# Patient Record
Sex: FEMALE | URBAN - METROPOLITAN AREA
[De-identification: names, ages, dates, MRNs, and addresses within clinical notes are randomized per-mention and may not be internally consistent; named-entity substitution may affect disease eponyms.]

---

## 2018-07-23 ENCOUNTER — HOSPITAL ENCOUNTER (OUTPATIENT)
Dept: LAB | Age: 53
Discharge: HOME | End: 2018-07-23

## 2018-08-02 ENCOUNTER — HOSPITAL ENCOUNTER (OUTPATIENT)
Dept: LAB | Age: 53
Discharge: HOME | End: 2018-08-02

## 2020-11-01 ENCOUNTER — HOSPITAL ENCOUNTER (EMERGENCY)
Age: 55
Discharge: HOME | End: 2020-11-01
Attending: EMERGENCY MEDICINE
Payer: COMMERCIAL

## 2020-11-01 VITALS
RESPIRATION RATE: 15 BRPM | BODY MASS INDEX: 47.09 KG/M2 | HEART RATE: 71 BPM | SYSTOLIC BLOOD PRESSURE: 128 MMHG | WEIGHT: 293 LBS | DIASTOLIC BLOOD PRESSURE: 86 MMHG | OXYGEN SATURATION: 99 % | HEIGHT: 66 IN | TEMPERATURE: 98.2 F

## 2020-11-01 DIAGNOSIS — H30.23 UVEITIS, INTERMEDIATE, BILATERAL: Primary | ICD-10-CM

## 2020-11-01 PROCEDURE — 99283 EMERGENCY DEPT VISIT LOW MDM: CPT

## 2020-11-01 PROCEDURE — 74011000250 HC RX REV CODE- 250: Performed by: STUDENT IN AN ORGANIZED HEALTH CARE EDUCATION/TRAINING PROGRAM

## 2020-11-01 PROCEDURE — 74011250636 HC RX REV CODE- 250/636: Performed by: PHYSICIAN ASSISTANT

## 2020-11-01 RX ORDER — TETRACAINE HYDROCHLORIDE 5 MG/ML
2 SOLUTION OPHTHALMIC
Status: COMPLETED | OUTPATIENT
Start: 2020-11-01 | End: 2020-11-01

## 2020-11-01 RX ORDER — MECLIZINE HCL 12.5 MG 12.5 MG/1
25 TABLET ORAL
Status: COMPLETED | OUTPATIENT
Start: 2020-11-01 | End: 2020-11-01

## 2020-11-01 RX ADMIN — TETRACAINE HYDROCHLORIDE 2 DROP: 5 SOLUTION OPHTHALMIC at 17:13

## 2020-11-01 RX ADMIN — MECLIZINE 25 MG: 12.5 TABLET ORAL at 16:41

## 2020-11-01 NOTE — ED PROVIDER NOTES
12-year-old female past medical history of of uterine fibroids, unknown eye infection/inflammation, von Willebrand disease presents [facility] to 2 to 3 days of bilateral eye pain, \"hazy vision\", headache, and vertigo. Patient states she had a mysterious eye infection/inflammation about 10 years ago which took a few months to resolve. At that time she had to have frequently checked eye pressures and use steroid eyedrops. Patient denies splashing any liquids or working with metal or wood that could have caused foreign body in the eyes. Denies fever, nausea, vomiting. Patient last saw an ophthalmologist in [place]. Does not have one locally. Does not have primary care locally. Patient has lots of floaters bilaterally, no veil over vision or loss of peripheral vision           Past Medical History:   Diagnosis Date    Fibroid uterus     History of eye infection     Von Willebrand disease ([address]        Past Surgical History:   Procedure Laterality Date    IR OCCL TXCATH ORGAN W SI           History reviewed. No pertinent family history. Social History     Socioeconomic History    Marital status:      Spouse name: Not on file    Number of children: Not on file    Years of education: Not on file    Highest education level: Not on file   Occupational History    Not on file   Social Needs    Financial resource strain: Not on file    Food insecurity     Worry: Not on file     Inability: Not on file    Transportation needs     Medical: Not on file     Non-medical: Not on file   Tobacco Use    Smoking status: Former Smoker    Smokeless tobacco: Never Used   Substance and Sexual Activity    Alcohol use:  Yes     Alcohol/week: 1.0 standard drinks     Types: 1 Glasses of wine per week     Comment: 1 glass/month     Drug use: Never    Sexual activity: Not on file   Lifestyle    Physical activity     Days per week: Not on file     Minutes per session: Not on file  Stress: Not on file   Relationships    Social connections     Talks on phone: Not on file     Gets together: Not on file     Attends [employer] service: Not on file     Active member of club or organization: Not on file     Attends meetings of clubs or organizations: Not on file     Relationship status: Not on file    Intimate partner violence     Fear of current or ex partner: Not on file     Emotionally abused: Not on file     Physically abused: Not on file     Forced sexual activity: Not on file   Other Topics Concern    Not on file   Social History Narrative    Not on file         ALLERGIES: Codeine and Egg derived    Review of Systems   Constitutional: Negative for fever. HENT: Negative for congestion and sore throat. Eyes: Positive for photophobia, pain, redness and visual disturbance. Bilateral, L>R   Respiratory: Negative for cough and shortness of breath. Cardiovascular: Negative for chest pain. Gastrointestinal: Negative for nausea and vomiting. Genitourinary: Negative for dysuria. Musculoskeletal: Negative for myalgias. Skin: Negative for rash. Neurological: Positive for dizziness and headaches. Negative for speech difficulty and weakness. Vitals:    11/01/20 1402   BP: 128/86   Pulse: 71   Resp: 15   Temp: 98.2 °F (36.8 °C)   SpO2: 99%   Weight: 138 kg (304 lb 3.8 oz)   Height: 5' 6\" (1.676 m)            Physical Exam  Vitals signs and nursing note reviewed. Constitutional:       General: She is not in acute distress. HENT:      Head: Normocephalic. Nose: Nose normal.      Mouth/Throat:      Mouth: Mucous membranes are moist.   Eyes:      Intraocular pressure: Right eye pressure is 25 mmHg. Left eye pressure is 24 mmHg. Extraocular Movements: Extraocular movements intact. Conjunctiva/sclera:      Right eye: Right conjunctiva is injected. No exudate. Left eye: Left conjunctiva is injected. No exudate.      Pupils: Pupils are equal, round, and reactive to light. Comments: Mild consensual photophobia, L>R   Neck:      Musculoskeletal: Normal range of motion. Pulmonary:      Effort: Pulmonary effort is normal. No respiratory distress. Skin:     General: Skin is dry. Findings: No rash. Neurological:      Mental Status: She is alert and oriented to person, place, and time. Cranial Nerves: Cranial nerves are intact. Sensory: Sensation is intact. Motor: Motor function is intact. Coordination: Coordination is intact. Gait: Gait is intact. Psychiatric:         Mood and Affect: Mood normal.        Medications   meclizine (ANTIVERT) tablet 25 mg (25 mg Oral Given 11/1/20 1641)   tetracaine HCl (PF) (PONTOCAINE) 0.5 % ophthalmic solution 2 Drop (2 Drops Both Eyes Given by Provider 11/1/20 1713)     Labs Reviewed - No data to display  No orders to display         MDM  Number of Diagnoses or Management Options  Uveitis, intermediate, bilateral:     Differential diagnosis includes glaucoma, conjunctivitis, uveitis, and others    Intraocular pressures mildly elevated, documented    History and current presentation consistent with bilateral uveitis. Consulted ophthalmology, spoke to Dr. Nicki Hu, who recommends immediate follow-up tomorrow morning with  [place].   Reviewed this plan with the patient who is agreeable    Case reviewed with attending physician, Dr. Mary Jack PA-C  11/1/2020

## 2020-11-01 NOTE — ED TRIAGE NOTES
Pt ambulatory to ED accompanied by  with c/o bilateral eye pain and redness onset 3 days ago. Referred to [facility] from [facility] for further evaluation. \"In 2010 I had something similar and was dx with an systemic infection in both eyes. I used steroid drops and eye pressure checked daily. It lasted all that summer\".

## 2020-11-01 NOTE — DISCHARGE INSTRUCTIONS
Patient Education        Uveitis: Care Instructions  Your Care Instructions     Uveitis (say \"you-vee-EYE-tus\") is swelling and tenderness of the middle layer of the eye. This area includes the colored part of the eye (iris), muscles, and blood vessels. One or both of your eyes may be swollen, red, and painful. You may have blurred vision. You may have gotten uveitis from an infection, but the cause is often not known. There are three types of uveitis. · Anterior uveitis is the most common type. This is pain and swelling of the front part of the eye. It is treated with eyedrops or ointment and usually lasts less than 6 weeks. · Intermediate uveitis affects the middle of the eye. It may be treated with eyedrops or with medicine given in a shot. It usually lasts longer than 6 weeks. · Posterior uveitis is the least common type. It affects the back of the eye. This is usually treated with medicine. It can last from a few weeks to a few years. It is important to treat uveitis. Treatment can save your eyesight and avoid permanent damage to your eyes. Follow-up care is a key part of your treatment and safety. Be sure to make and go to all appointments, and call your doctor if you are having problems. It's also a good idea to know your test results and keep a list of the medicines you take. How can you care for yourself at home? · Take your medicines exactly as prescribed. Call your doctor if you have any problems with your medicine. You will get more details on the specific medicines your doctor prescribes. · Use any prescribed eyedrops or ointments exactly as your doctor told you to. · Keep the eyedropper or bottle tip clean. · If you are using eyedrops and an ointment, put in the eyedrops before you use the ointment. · To put in eyedrops or ointment:  ? Tilt your head back, and pull your lower eyelid down with one finger. ? Drop or squirt the medicine inside the lower lid.   ? Close your eye for 30 to 60 seconds to let the drops or ointment move around. ? Do not touch the ointment or dropper tip to your eyelashes or any other surface. · Wear sunglasses if light hurts your eyes. · Do not wear contact lenses until your eyes have healed. · Do not wear eye makeup until your eyes have healed. · Do not drive if you have blurred vision. When should you call for help? Call your doctor now or seek immediate medical care if:    · You have signs of an eye infection, such as:  ? Pus or thick discharge coming from the eye.  ? Redness or swelling around the eye.  ? A fever.     · You have new or worse eye pain.     · You have vision changes.     · It feels like there is something in your eye.     · Light hurts your eye. Watch closely for changes in your health, and be sure to contact your doctor if:    · You do not get better as expected. Where can you learn more? Go to http://www.gray.com/  Enter A559 in the search box to learn more about \"Uveitis: Care Instructions. \"  Current as of: December 18, 2019               Content Version: 12.6  © 5968-7310 [employer]. Care instructions adapted under license by [employer] (which disclaims liability or warranty for this information). If you have questions about a medical condition or this instruction, always ask your healthcare professional. [address] any warranty or liability for your use of this information.

## 2020-11-02 ENCOUNTER — TRANSCRIBE ORDER (OUTPATIENT)
Dept: GENERAL RADIOLOGY | Age: 55
End: 2020-11-02

## 2020-11-02 ENCOUNTER — HOSPITAL ENCOUNTER (OUTPATIENT)
Dept: GENERAL RADIOLOGY | Age: 55
Discharge: HOME | End: 2020-11-02
Payer: COMMERCIAL

## 2020-11-02 DIAGNOSIS — H20.9 GLAUCOMATOCYCLITIC CRISES: ICD-10-CM

## 2020-11-02 DIAGNOSIS — H40.40X0 GLAUCOMATOCYCLITIC CRISES: Primary | ICD-10-CM

## 2020-11-02 DIAGNOSIS — H20.9 GLAUCOMATOCYCLITIC CRISES: Primary | ICD-10-CM

## 2020-11-02 DIAGNOSIS — H40.40X0 GLAUCOMATOCYCLITIC CRISES: ICD-10-CM

## 2020-11-02 PROCEDURE — 71046 X-RAY EXAM CHEST 2 VIEWS: CPT

## 2024-10-23 ENCOUNTER — TELEPHONE (OUTPATIENT)
Age: 59
End: 2024-10-23

## 2024-10-23 NOTE — TELEPHONE ENCOUNTER
Patient has New Patient appointment with Dr Ervin  11/7/2024. She states she has Von Abrahan Disease but she has no proof. She would like to add those labs along with the Pre-Op lab work she will need that day     [phone]

## 2024-11-05 SDOH — HEALTH STABILITY: PHYSICAL HEALTH: ON AVERAGE, HOW MANY DAYS PER WEEK DO YOU ENGAGE IN MODERATE TO STRENUOUS EXERCISE (LIKE A BRISK WALK)?: 6 DAYS

## 2024-11-05 SDOH — HEALTH STABILITY: PHYSICAL HEALTH: ON AVERAGE, HOW MANY MINUTES DO YOU ENGAGE IN EXERCISE AT THIS LEVEL?: 40 MIN

## 2024-11-07 ENCOUNTER — OFFICE VISIT (OUTPATIENT)
Age: 59
End: 2024-11-07
Payer: COMMERCIAL

## 2024-11-07 ENCOUNTER — LAB (OUTPATIENT)
Age: 59
End: 2024-11-07

## 2024-11-07 VITALS
BODY MASS INDEX: 23.66 KG/M2 | HEIGHT: 65 IN | SYSTOLIC BLOOD PRESSURE: 112 MMHG | OXYGEN SATURATION: 99 % | WEIGHT: 142 LBS | DIASTOLIC BLOOD PRESSURE: 78 MMHG | TEMPERATURE: 97 F | HEART RATE: 60 BPM | RESPIRATION RATE: 16 BRPM

## 2024-11-07 DIAGNOSIS — Z11.59 ENCOUNTER FOR HCV SCREENING TEST FOR LOW RISK PATIENT: ICD-10-CM

## 2024-11-07 DIAGNOSIS — Z13.220 SCREENING FOR LIPID DISORDERS: ICD-10-CM

## 2024-11-07 DIAGNOSIS — Z01.818 PRE-OPERATIVE CLEARANCE: ICD-10-CM

## 2024-11-07 DIAGNOSIS — D68.9 COAGULATION DISORDER (HCC): ICD-10-CM

## 2024-11-07 DIAGNOSIS — Z11.4 SCREENING FOR HIV (HUMAN IMMUNODEFICIENCY VIRUS): ICD-10-CM

## 2024-11-07 DIAGNOSIS — Z23 ENCOUNTER FOR IMMUNIZATION: ICD-10-CM

## 2024-11-07 DIAGNOSIS — C44.310 BASAL CELL CARCINOMA OF FACE: Primary | ICD-10-CM

## 2024-11-07 DIAGNOSIS — Z53.20 PAP SMEAR OF CERVIX DECLINED: ICD-10-CM

## 2024-11-07 LAB
ALBUMIN SERPL-MCNC: 3.7 G/DL (ref 3.5–5)
ALBUMIN/GLOB SERPL: 1.2 (ref 1.1–2.2)
ALP SERPL-CCNC: 114 U/L (ref 45–117)
ALT SERPL-CCNC: 32 U/L (ref 12–78)
ANION GAP SERPL CALC-SCNC: 1 MMOL/L (ref 2–12)
AST SERPL-CCNC: 22 U/L (ref 15–37)
BASOPHILS # BLD: 0.1 K/UL (ref 0–0.1)
BASOPHILS NFR BLD: 1 % (ref 0–1)
BILIRUB SERPL-MCNC: 0.9 MG/DL (ref 0.2–1)
BUN SERPL-MCNC: 14 MG/DL (ref 6–20)
BUN/CREAT SERPL: 18 (ref 12–20)
CALCIUM SERPL-MCNC: 9.7 MG/DL (ref 8.5–10.1)
CHLORIDE SERPL-SCNC: 109 MMOL/L (ref 97–108)
CHOLEST SERPL-MCNC: 224 MG/DL
CO2 SERPL-SCNC: 32 MMOL/L (ref 21–32)
CREAT SERPL-MCNC: 0.76 MG/DL (ref 0.55–1.02)
DIFFERENTIAL METHOD BLD: NORMAL
EOSINOPHIL # BLD: 0.4 K/UL (ref 0–0.4)
EOSINOPHIL NFR BLD: 7 % (ref 0–7)
ERYTHROCYTE [DISTWIDTH] IN BLOOD BY AUTOMATED COUNT: 12.7 % (ref 11.5–14.5)
GLOBULIN SER CALC-MCNC: 3.1 G/DL (ref 2–4)
GLUCOSE SERPL-MCNC: 101 MG/DL (ref 65–100)
HCT VFR BLD AUTO: 44.2 % (ref 35–47)
HCV AB SER IA-ACNC: <0.02 INDEX
HCV AB SERPL QL IA: NONREACTIVE
HDLC SERPL-MCNC: 70 MG/DL
HDLC SERPL: 3.2 (ref 0–5)
HGB BLD-MCNC: 14.3 G/DL (ref 11.5–16)
HIV 1+2 AB+HIV1 P24 AG SERPL QL IA: NONREACTIVE
HIV 1/2 RESULT COMMENT: NORMAL
IMM GRANULOCYTES # BLD AUTO: 0 K/UL (ref 0–0.04)
IMM GRANULOCYTES NFR BLD AUTO: 0 % (ref 0–0.5)
LDLC SERPL CALC-MCNC: 134.2 MG/DL (ref 0–100)
LYMPHOCYTES # BLD: 1.1 K/UL (ref 0.8–3.5)
LYMPHOCYTES NFR BLD: 18 % (ref 12–49)
MCH RBC QN AUTO: 29.9 PG (ref 26–34)
MCHC RBC AUTO-ENTMCNC: 32.4 G/DL (ref 30–36.5)
MCV RBC AUTO: 92.3 FL (ref 80–99)
MONOCYTES # BLD: 0.5 K/UL (ref 0–1)
MONOCYTES NFR BLD: 9 % (ref 5–13)
NEUTS SEG # BLD: 3.7 K/UL (ref 1.8–8)
NEUTS SEG NFR BLD: 65 % (ref 32–75)
NRBC # BLD: 0 K/UL (ref 0–0.01)
NRBC BLD-RTO: 0 PER 100 WBC
PLATELET # BLD AUTO: 262 K/UL (ref 150–400)
PMV BLD AUTO: 10.9 FL (ref 8.9–12.9)
POTASSIUM SERPL-SCNC: 4.9 MMOL/L (ref 3.5–5.1)
PROT SERPL-MCNC: 6.8 G/DL (ref 6.4–8.2)
RBC # BLD AUTO: 4.79 M/UL (ref 3.8–5.2)
SODIUM SERPL-SCNC: 142 MMOL/L (ref 136–145)
TRIGL SERPL-MCNC: 99 MG/DL
VLDLC SERPL CALC-MCNC: 19.8 MG/DL
WBC # BLD AUTO: 5.8 K/UL (ref 3.6–11)

## 2024-11-07 PROCEDURE — 90471 IMMUNIZATION ADMIN: CPT | Performed by: FAMILY MEDICINE

## 2024-11-07 PROCEDURE — 90715 TDAP VACCINE 7 YRS/> IM: CPT | Performed by: FAMILY MEDICINE

## 2024-11-07 PROCEDURE — 99203 OFFICE O/P NEW LOW 30 MIN: CPT | Performed by: FAMILY MEDICINE

## 2024-11-07 RX ORDER — PREDNISOLONE ACETATE 10 MG/ML
SUSPENSION/ DROPS OPHTHALMIC
COMMUNITY
Start: 2024-08-22

## 2024-11-07 SDOH — ECONOMIC STABILITY: FOOD INSECURITY: WITHIN THE PAST 12 MONTHS, THE FOOD YOU BOUGHT JUST DIDN'T LAST AND YOU DIDN'T HAVE MONEY TO GET MORE.: NEVER TRUE

## 2024-11-07 SDOH — ECONOMIC STABILITY: FOOD INSECURITY: WITHIN THE PAST 12 MONTHS, YOU WORRIED THAT YOUR FOOD WOULD RUN OUT BEFORE YOU GOT MONEY TO BUY MORE.: NEVER TRUE

## 2024-11-07 SDOH — ECONOMIC STABILITY: INCOME INSECURITY: HOW HARD IS IT FOR YOU TO PAY FOR THE VERY BASICS LIKE FOOD, HOUSING, MEDICAL CARE, AND HEATING?: NOT HARD AT ALL

## 2024-11-07 ASSESSMENT — PATIENT HEALTH QUESTIONNAIRE - PHQ9
SUM OF ALL RESPONSES TO PHQ QUESTIONS 1-9: 0
2. FEELING DOWN, DEPRESSED OR HOPELESS: NOT AT ALL
SUM OF ALL RESPONSES TO PHQ9 QUESTIONS 1 & 2: 0
SUM OF ALL RESPONSES TO PHQ QUESTIONS 1-9: 0
1. LITTLE INTEREST OR PLEASURE IN DOING THINGS: NOT AT ALL

## 2024-11-07 NOTE — PROGRESS NOTES
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         Health Maintenance Due   Topic    Depression Screen     HIV screen     Hepatitis C screen     DTaP/Tdap/Td vaccine (1 - Tdap)    Cervical cancer screen     Lipids     Breast cancer screen     Colorectal Cancer Screen     Shingles vaccine (1 of 2)    Respiratory Syncytial Virus (RSV) Pregnant or age 60 yrs+ (1 - 1-dose 60+ series)    Flu vaccine (1)    COVID-19 Vaccine ( - - season)       Wt Readings from Last 3 Encounters:   24 64.4 kg (142 lb)     Temp Readings from Last 3 Encounters:   24 97 °F (36.1 °C) (Temporal)     BP Readings from Last 3 Encounters:   24 112/78     Pulse Readings from Last 3 Encounters:   24 60           Depression Screening:  :         2024     9:09 AM   PHQ-9 Questionaire   Little interest or pleasure in doing things 0   Feeling down, depressed, or hopeless 0   PHQ-9 Total Score 0        Fall Risk Assessment:  :          No data to display                 Abuse Screening:  :          No data to display                 Coordination of Care Questionnaire:  :     \"Have you been to the ER, urgent care clinic since your last visit?  Hospitalized since your last visit?\"    NO    “Have you seen or consulted any other health care providers outside our system since your last visit?”    NO    Have you had a mammogram?”   NO    No breast cancer screening on file      “Have you had a pap smear?”    NO    No cervical cancer screening on file       “Have you had a colorectal cancer screening such as a colonoscopy/FIT/Cologuard?    NO    No colonoscopy on file  No cologuard on file  No FIT/FOBT on file   No flexible sigmoidoscopy on file       Click Here for Release of Records Request      
impacted cerumen.      Nose: Nose normal. No congestion or rhinorrhea.      Mouth/Throat:      Mouth: Mucous membranes are moist.      Pharynx: Oropharynx is clear. No oropharyngeal exudate.   Cardiovascular:      Rate and Rhythm: Normal rate and regular rhythm.      Heart sounds: Normal heart sounds.   Pulmonary:      Effort: Pulmonary effort is normal.      Breath sounds: Normal breath sounds.   Musculoskeletal:      Cervical back: Normal range of motion.   Lymphadenopathy:      Cervical: No cervical adenopathy.   Skin:     General: Skin is warm and dry.      Findings: No rash.   Neurological:      General: No focal deficit present.      Mental Status: She is alert and oriented to person, place, and time.      Cranial Nerves: No cranial nerve deficit.   Psychiatric:         Mood and Affect: Mood normal.         Behavior: Behavior normal.         No results found for this visit on 11/07/24.    Assessment/ Plan:   Elise was seen today for new patient, establish care and pre-op exam.    Diagnoses and all orders for this visit:    Basal cell carcinoma of face    Coagulation disorder (HCC)  -     Von Willebrand Panel; Future    Screening for HIV (human immunodeficiency virus)  -     HIV 1/2 Ag/Ab, 4TH Generation,W Rflx Confirm; Future    Encounter for HCV screening test for low risk patient  -     Hepatitis C Antibody; Future    Screening for lipid disorders  -     Lipid Panel; Future  -     Comprehensive Metabolic Panel; Future  -     CBC with Auto Differential; Future    Pap smear of cervix declined    Encounter for immunization  -     Tdap, BOOSTRIX, (age 10 yrs+), IM    Pre-operative clearance         All of the above diagnoses have the status of \"stable\" unless described below.  The plan for all above diagnoses is for the patient to return as documented below for continued follow up.  More specific details regarding the plan for certain diagnoses immediately follows.    She is appropriate candidate for surgery

## 2024-11-09 LAB
FACT VIII ACT/NOR PPP: 147 % (ref 56–140)
INTERPRETATION: ABNORMAL
VWF AG ACT/NOR PPP IA: 188 % (ref 50–200)
VWF:RCO ACT/NOR PPP PL AGG: 167 % (ref 50–200)

## 2024-11-11 ENCOUNTER — TELEPHONE (OUTPATIENT)
Age: 59
End: 2024-11-11

## 2024-11-11 NOTE — TELEPHONE ENCOUNTER
Alissa with [facility] (Dr Dillard)  Patient is supposed to having surgery and the anesthesiologist wants a letter stating if she does or doesn't have Von Willebrand Disease . Surgery Date 11/19/2024    Fax # [phone]

## 2025-01-20 ENCOUNTER — COMMUNITY OUTREACH (OUTPATIENT)
Age: 60
End: 2025-01-20